# Patient Record
Sex: MALE | Race: WHITE | NOT HISPANIC OR LATINO | Employment: FULL TIME | URBAN - METROPOLITAN AREA
[De-identification: names, ages, dates, MRNs, and addresses within clinical notes are randomized per-mention and may not be internally consistent; named-entity substitution may affect disease eponyms.]

---

## 2017-01-03 PROBLEM — M25.511 RIGHT SHOULDER PAIN: Status: ACTIVE | Noted: 2017-01-03

## 2017-01-03 PROBLEM — S43.439A LABRAL TEAR OF SHOULDER: Status: ACTIVE | Noted: 2017-01-03

## 2019-02-03 ENCOUNTER — HOSPITAL ENCOUNTER (EMERGENCY)
Facility: MEDICAL CENTER | Age: 29
End: 2019-02-03
Attending: EMERGENCY MEDICINE
Payer: MEDICAID

## 2019-02-03 VITALS
WEIGHT: 191.8 LBS | TEMPERATURE: 97.6 F | RESPIRATION RATE: 18 BRPM | DIASTOLIC BLOOD PRESSURE: 84 MMHG | OXYGEN SATURATION: 98 % | HEIGHT: 71 IN | BODY MASS INDEX: 26.85 KG/M2 | HEART RATE: 96 BPM | SYSTOLIC BLOOD PRESSURE: 120 MMHG

## 2019-02-03 DIAGNOSIS — L03.032 PARONYCHIA OF FIFTH TOE OF LEFT FOOT: ICD-10-CM

## 2019-02-03 DIAGNOSIS — S90.222D CONTUSION OF LESSER TOE OF LEFT FOOT WITH DAMAGE TO NAIL, SUBSEQUENT ENCOUNTER: ICD-10-CM

## 2019-02-03 PROCEDURE — A6403 STERILE GAUZE>16 <= 48 SQ IN: HCPCS

## 2019-02-03 PROCEDURE — 303977 HCHG I & D

## 2019-02-03 PROCEDURE — 96372 THER/PROPH/DIAG INJ SC/IM: CPT | Mod: XU

## 2019-02-03 PROCEDURE — 700111 HCHG RX REV CODE 636 W/ 250 OVERRIDE (IP)

## 2019-02-03 PROCEDURE — 99284 EMERGENCY DEPT VISIT MOD MDM: CPT

## 2019-02-03 RX ORDER — KETOROLAC TROMETHAMINE 30 MG/ML
INJECTION, SOLUTION INTRAMUSCULAR; INTRAVENOUS
Status: COMPLETED
Start: 2019-02-03 | End: 2019-02-03

## 2019-02-03 RX ORDER — HYDROCODONE BITARTRATE AND ACETAMINOPHEN 5; 325 MG/1; MG/1
1-2 TABLET ORAL EVERY 6 HOURS PRN
Qty: 12 TAB | Refills: 0 | Status: SHIPPED | OUTPATIENT
Start: 2019-02-03 | End: 2019-02-06

## 2019-02-03 RX ORDER — KETOROLAC TROMETHAMINE 30 MG/ML
30 INJECTION, SOLUTION INTRAMUSCULAR; INTRAVENOUS ONCE
Status: COMPLETED | OUTPATIENT
Start: 2019-02-03 | End: 2019-02-03

## 2019-02-03 RX ORDER — ACETAMINOPHEN 500 MG
1000 TABLET ORAL EVERY 6 HOURS PRN
COMMUNITY

## 2019-02-03 RX ADMIN — KETOROLAC TROMETHAMINE 30 MG: 30 INJECTION, SOLUTION INTRAMUSCULAR; INTRAVENOUS at 13:27

## 2019-02-03 RX ADMIN — KETOROLAC TROMETHAMINE 30 MG: 30 INJECTION, SOLUTION INTRAMUSCULAR at 13:27

## 2019-02-03 ASSESSMENT — PAIN DESCRIPTION - DESCRIPTORS: DESCRIPTORS: ACHING;BURNING

## 2019-02-03 NOTE — ED TRIAGE NOTES
"Pt presents accompanied by his spouse.  As he states, approximately a week afgo he dropped a bucket full of dirt on his left fifth toe.  He C/O possible wound infection.  Chief Complaint   Patient presents with   • Toe Pain     /83   Pulse 72   Temp 36.4 °C (97.6 °F) (Temporal)   Resp 18   Ht 1.803 m (5' 11\")   Wt 87 kg (191 lb 12.8 oz)   SpO2 98%   BMI 26.75 kg/m²     "

## 2019-02-03 NOTE — ED NOTES
Discharged to home with instructions and prescriptions.  Patient instructed to return for wound recheck in 2 days or sooner if needed  Went over signs of worsening infection and dressing changes - all with good understanding by patient

## 2019-02-03 NOTE — ED PROVIDER NOTES
"ED Provider Note    ER PROVIDER NOTE          CHIEF COMPLAINT  Chief Complaint   Patient presents with   • Toe Pain       HPI  Dyllan Davis is a 28 y.o. male who presents to the emergency department complaining of left 5th toe pain.  Patient reports 1 week ago he dropped a bucket on his toe, and it caused the toenail to fall off.  He notes over the past 2 days some increased redness and swelling, went to [facility] last night and was given Keflex states his symptoms have not improved.  He reports he was told to nena tape his toe and this seemed to make it worse.  He denies any fevers or chills, denies any drainage.     No history of diabetes or other immunocompromise    REVIEW OF SYSTEMS  Pertinent positives include toe pain, redness. Pertinent negatives include no fever. See HPI for details. All other systems reviewed and are negative.    PAST MEDICAL HISTORY   has a past medical history of Cold (12/15/16); Heart burn; and Sinus infection.    SOCIAL HISTORY  Social History   Substance Use Topics   • Smoking status: Current Every Day Smoker     Packs/day: 0.25     Years: 5.00     Types: Cigarettes   • Smokeless tobacco: Never Used   • Alcohol use Yes      Comment: Occasionally       SURGICAL HISTORY   has a past surgical history that includes appendectomy laparoscopic (5/1/2015) and shoulder arthroscopy w/ slap / labral repair (Right, 1/3/2017).    CURRENT MEDICATIONS  Home Medications     Reviewed by Yovana Little (Pharmacy Tech) on 02/03/19 at 1246  Med List Status: Complete   Medication Last Dose Status   acetaminophen (TYLENOL) 500 MG Tab 2/2/2019 Active   omeprazole (PRILOSEC) 40 MG delayed-release capsule 2/3/2019 Active                ALLERGIES  No Known Allergies    PHYSICAL EXAM  VITAL SIGNS: /83   Pulse 72   Temp 36.4 °C (97.6 °F) (Temporal)   Resp 18   Ht 1.803 m (5' 11\")   Wt 87 kg (191 lb 12.8 oz)   SpO2 98%   BMI 26.75 kg/m²   Pulse ox interpretation: I interpret this " pulse ox as normal.    Constitutional: Alert.  In no apparent distress.  HENT: Normocephalic, Atraumatic, Bilateral external ears normal. Nose normal.   Eyes: Pupils are equal and reactive. Conjunctiva normal, non-icteric.   Heart: Regular rate and rhythm, no murmurs.    Lungs: Clear to auscultation bilaterally.  Skin: Warm, Dry, No erythema, No rash.   Musculoskeletal: Swelling to fifth digit of left foot and the distal phalanx, moderate erythema, no proximal streaking noted or foot edema or erythema.  Toenail is not present, there is a small fluctuant area on the dorsum was distal aspect of his toe, otherwise no tenderness or major deformities noted. No edema.  Neurologic: Alert, Grossly non-focal.   Psychiatric: Affect normal, Judgment normal, Mood normal, Appears appropriate and not intoxicated.     DIAGNOSTIC STUDIES / PROCEDURES      RADIOLOGY  No orders to display     The radiologist's interpretation of all radiological studies have been reviewed by me.    COURSE & MEDICAL DECISION MAKING  Nursing notes, VS, PMSFHx reviewed in chart.    12:39 PM - Patient seen and examined at bedside. Patient will be treated with ketorolac.     Reviewed records from outside facility, x-ray negative for fracture, 7 day course of Keflex    INCISION AND DRAINAGE PROCEDURE NOTE:  Patient identification was confirmed and consent was obtained.  This procedure was performed by Dr. Bush at 1:18 PM  .  Site: Fifth toe left foot  Sterile procedures observed, prepped with Betadine, sterile drape  Needle size: 276  Anesthetic used (type and amt): 2cc lidocaine for digital block  Blade size: 11  Drainage: 1 cc purulent    Site anesthetized, incision made over site, wound drained and explored loculations, rinsed with copious amounts of normal saline, covered with dry, sterile dressing. Pt tolerated procedure well without complications. Instructions for care discussed verbally and pt provided with additional written instructions for  homecare and f/u.          Decision Making:  This is a 28 y.o. male presented with toe pain after an injury last week.  He does appear to have a soft tissue infection with small paronychia/abscess that was drained as above.  He will continue on his antibiotics and follow-up for recheck in 2 days.  He has no fevers or other findings suggestive of sepsis or progressive infection    I reviewed prescription monitoring program for patient's narcotic use before prescribing a scheduled drug.The patient will not drink alcohol nor drive with prescribed medications. The patient will return for new or worsening symptoms and is stable at the time of discharge.    The patient is referred to a primary physician for blood pressure management, diabetic screening, and for all other preventative health concerns.    In prescribing controlled substances to this patient, I certify that I have obtained and reviewed the medical history of Dyllan Davis. I have also made a good kory effort to obtain applicable records from other providers who have treated the patient and records did not demonstrate any increased risk of substance abuse that would prevent me from prescribing controlled substances.     I have conducted a physical exam and documented it. I have reviewed Mr. Davis’s prescription history as maintained by the [place] Prescription Monitoring Program.     I have assessed the patient’s risk for abuse, dependency, and addiction using the validated Opioid Risk Tool available at https://www.mdcalc.com/fhffzj-oxzh-xfxl-ort-narcotic-abuse.     Given the above, I believe the benefits of controlled substance therapy outweigh the risks. The reasons for prescribing controlled substances include non-narcotic, oral analgesic alternatives have been inadequate for pain control. Accordingly, I have discussed the risk and benefits, treatment plan, and alternative therapies with the patient.         DISPOSITION:  Patient will be discharged home  in stable condition.    FOLLOW UP:  [facility], Emergency Dept  [address]  [place] [address] [phone]  In 2 days  For wound re-check      OUTPATIENT MEDICATIONS:  New Prescriptions    HYDROCODONE-ACETAMINOPHEN (NORCO) 5-325 MG TAB PER TABLET    Take 1-2 Tabs by mouth every 6 hours as needed (pain) for up to 3 days.         FINAL IMPRESSION  1. Paronychia of fifth toe of left foot    2. Contusion of lesser toe of left foot with damage to nail, subsequent encounter        The note accurately reflects work and decisions made by me.  Garcia Bush  2/3/2019  1:36 PM

## 2019-02-03 NOTE — ED NOTES
Med rec updated and complete  Allergies reviewed  Interviewed pt with wife at bedside with permission from pt.  Pt reports no vitamins.  Pt reports no antibiotics in the last 30 days, that he had to take at home.

## 2021-07-11 ENCOUNTER — HOSPITAL ENCOUNTER (EMERGENCY)
Facility: MEDICAL CENTER | Age: 31
End: 2021-07-12
Payer: MEDICAID

## 2021-07-11 VITALS
SYSTOLIC BLOOD PRESSURE: 148 MMHG | WEIGHT: 205.25 LBS | RESPIRATION RATE: 18 BRPM | HEART RATE: 84 BPM | TEMPERATURE: 97.8 F | OXYGEN SATURATION: 96 % | HEIGHT: 71 IN | DIASTOLIC BLOOD PRESSURE: 88 MMHG | BODY MASS INDEX: 28.73 KG/M2

## 2021-07-11 PROCEDURE — 302449 STATCHG TRIAGE ONLY (STATISTIC)

## 2021-07-11 RX ORDER — CITALOPRAM 20 MG/1
10 TABLET ORAL DAILY
COMMUNITY

## 2021-07-12 NOTE — ED TRIAGE NOTES
".  Chief Complaint   Patient presents with   • Panic Attack      Pt ambulate to triage with above complaint. Pt reports being seen by PCP a month ago for same complaint, Pt prescribed Celexa. Pt notes he has been feeling well for the last 3 weeks but notes \"something set me off tonight, either something I ate or?\" Pt took prescribed mediation w/o relief.  Notes he feels bloated and a fullness in his throat, Pt take Prilosec for GERD. Pt relates his abdominal complaints to his panic attack.   Pt educated on triage process and returned to [facility].   "

## 2023-04-14 ENCOUNTER — APPOINTMENT (OUTPATIENT)
Dept: URGENT CARE | Facility: PHYSICIAN GROUP | Age: 33
End: 2023-04-14

## 2023-05-23 ENCOUNTER — APPOINTMENT (OUTPATIENT)
Dept: RADIOLOGY | Facility: MEDICAL CENTER | Age: 33
End: 2023-05-23
Attending: EMERGENCY MEDICINE
Payer: COMMERCIAL

## 2023-05-23 ENCOUNTER — HOSPITAL ENCOUNTER (EMERGENCY)
Facility: MEDICAL CENTER | Age: 33
End: 2023-05-23
Attending: EMERGENCY MEDICINE
Payer: COMMERCIAL

## 2023-05-23 VITALS
SYSTOLIC BLOOD PRESSURE: 99 MMHG | OXYGEN SATURATION: 97 % | TEMPERATURE: 97.3 F | WEIGHT: 160 LBS | HEIGHT: 71 IN | DIASTOLIC BLOOD PRESSURE: 62 MMHG | BODY MASS INDEX: 22.4 KG/M2 | RESPIRATION RATE: 16 BRPM | HEART RATE: 76 BPM

## 2023-05-23 DIAGNOSIS — R55 NEAR SYNCOPE: ICD-10-CM

## 2023-05-23 LAB
ALBUMIN SERPL BCP-MCNC: 4.6 G/DL (ref 3.2–4.9)
ALBUMIN/GLOB SERPL: 1.9 G/DL
ALP SERPL-CCNC: 125 U/L (ref 30–99)
ALT SERPL-CCNC: 23 U/L (ref 2–50)
ANION GAP SERPL CALC-SCNC: 14 MMOL/L (ref 7–16)
AST SERPL-CCNC: 21 U/L (ref 12–45)
BASOPHILS # BLD AUTO: 0.4 % (ref 0–1.8)
BASOPHILS # BLD: 0.04 K/UL (ref 0–0.12)
BILIRUB SERPL-MCNC: 0.4 MG/DL (ref 0.1–1.5)
BUN SERPL-MCNC: 14 MG/DL (ref 8–22)
CALCIUM ALBUM COR SERPL-MCNC: 8.8 MG/DL (ref 8.5–10.5)
CALCIUM SERPL-MCNC: 9.3 MG/DL (ref 8.5–10.5)
CHLORIDE SERPL-SCNC: 104 MMOL/L (ref 96–112)
CO2 SERPL-SCNC: 23 MMOL/L (ref 20–33)
CREAT SERPL-MCNC: 0.97 MG/DL (ref 0.5–1.4)
EKG IMPRESSION: NORMAL
EOSINOPHIL # BLD AUTO: 0.03 K/UL (ref 0–0.51)
EOSINOPHIL NFR BLD: 0.3 % (ref 0–6.9)
ERYTHROCYTE [DISTWIDTH] IN BLOOD BY AUTOMATED COUNT: 40.3 FL (ref 35.9–50)
GFR SERPLBLD CREATININE-BSD FMLA CKD-EPI: 105 ML/MIN/1.73 M 2
GLOBULIN SER CALC-MCNC: 2.4 G/DL (ref 1.9–3.5)
GLUCOSE SERPL-MCNC: 119 MG/DL (ref 65–99)
HCT VFR BLD AUTO: 45.1 % (ref 42–52)
HGB BLD-MCNC: 15.3 G/DL (ref 14–18)
IMM GRANULOCYTES # BLD AUTO: 0.04 K/UL (ref 0–0.11)
IMM GRANULOCYTES NFR BLD AUTO: 0.4 % (ref 0–0.9)
LIPASE SERPL-CCNC: 23 U/L (ref 11–82)
LYMPHOCYTES # BLD AUTO: 2.09 K/UL (ref 1–4.8)
LYMPHOCYTES NFR BLD: 18.6 % (ref 22–41)
MCH RBC QN AUTO: 30.7 PG (ref 27–33)
MCHC RBC AUTO-ENTMCNC: 33.9 G/DL (ref 32.3–36.5)
MCV RBC AUTO: 90.6 FL (ref 81.4–97.8)
MONOCYTES # BLD AUTO: 0.51 K/UL (ref 0–0.85)
MONOCYTES NFR BLD AUTO: 4.5 % (ref 0–13.4)
NEUTROPHILS # BLD AUTO: 8.53 K/UL (ref 1.82–7.42)
NEUTROPHILS NFR BLD: 75.8 % (ref 44–72)
NRBC # BLD AUTO: 0 K/UL
NRBC BLD-RTO: 0 /100 WBC (ref 0–0.2)
PLATELET # BLD AUTO: 218 K/UL (ref 164–446)
PMV BLD AUTO: 9.9 FL (ref 9–12.9)
POTASSIUM SERPL-SCNC: 3.4 MMOL/L (ref 3.6–5.5)
PROT SERPL-MCNC: 7 G/DL (ref 6–8.2)
RBC # BLD AUTO: 4.98 M/UL (ref 4.7–6.1)
SODIUM SERPL-SCNC: 141 MMOL/L (ref 135–145)
TROPONIN T SERPL-MCNC: <6 NG/L (ref 6–19)
WBC # BLD AUTO: 11.2 K/UL (ref 4.8–10.8)

## 2023-05-23 PROCEDURE — 85025 COMPLETE CBC W/AUTO DIFF WBC: CPT

## 2023-05-23 PROCEDURE — 83690 ASSAY OF LIPASE: CPT

## 2023-05-23 PROCEDURE — 93005 ELECTROCARDIOGRAM TRACING: CPT | Performed by: EMERGENCY MEDICINE

## 2023-05-23 PROCEDURE — 93005 ELECTROCARDIOGRAM TRACING: CPT

## 2023-05-23 PROCEDURE — 84484 ASSAY OF TROPONIN QUANT: CPT

## 2023-05-23 PROCEDURE — 71045 X-RAY EXAM CHEST 1 VIEW: CPT

## 2023-05-23 PROCEDURE — 99284 EMERGENCY DEPT VISIT MOD MDM: CPT

## 2023-05-23 PROCEDURE — 36415 COLL VENOUS BLD VENIPUNCTURE: CPT

## 2023-05-23 PROCEDURE — 80053 COMPREHEN METABOLIC PANEL: CPT

## 2023-05-23 NOTE — ED TRIAGE NOTES
"Chief Complaint   Patient presents with    Dizziness     Patient reports dizziness and near syncopal event this morning at 1430. Patient describes dizziness as \"feeling like I am on a boat\" and \"like I am not there.\" Patient reports associated abdominal pain. Patient was concerned with hypoglycemia at time of event. FSBS was 140.     Weakness     X a few months.        "

## 2023-05-24 NOTE — ED NOTES
Patient to YE [facility], changed into gown, placed on monitor, given urinal and asked for urine specimen, call light within reach.

## 2023-05-24 NOTE — ED NOTES
Assist RN: PIV removed. Provided pt with written and verbal instructions regarding follow-up and activity. All questions answered and patient verbalized understanding. Pt ambulated out of unit with steady gait.

## 2023-05-24 NOTE — ED PROVIDER NOTES
"ED Provider Note    CHIEF COMPLAINT  Chief Complaint   Patient presents with    Dizziness     Patient reports dizziness and near syncopal event this morning at 1430. Patient describes dizziness as \"feeling like I am on a boat\" and \"like I am not there.\" Patient reports associated abdominal pain. Patient was concerned with hypoglycemia at time of event. FSBS was 140.     Weakness     X a few months.        EXTERNAL RECORDS REVIEWED  Inpatient Notes ED note 2/3/19    HPI/ROS  LIMITATION TO HISTORY   Select: : None  OUTSIDE HISTORIAN(S):  None    Dyllan Davis is a 32 y.o. male who presents to the emergency department for evaluation of dizziness and weakness.  States that over the last couple of years he has had intermittent episodes where he feels dizzy.  He describes the dizziness as feeling lightheaded.  He states that today it was much worse while he was at work.  He states that his vision narrowed and was black around the edges.  He states he felt like he was going to pass out.  He denies any associated chest pain, shortness of breath, or palpitations.  He denies a headache or focal weakness or numbness.  He states that he generally felt weak.  He did not lose consciousness.  He admits to some nausea but has not vomited.  He has not had any diarrhea or bloody stools.  He does not take any daily medications.    He denies a personal history of hypertension, hyperlipidemia, coronary artery disease or diabetes.  He denies any family history of coronary artery disease.  He denies tobacco use, alcohol or other drug use.    He has otherwise been well with no recent fevers, cough, congestion, or difficulty breathing.    PAST MEDICAL HISTORY   has a past medical history of Cold (12/15/16), Heart burn, and Sinus infection.    SURGICAL HISTORY   has a past surgical history that includes appendectomy laparoscopic (5/1/2015) and shoulder arthroscopy w/ slap / labral repair (Right, 1/3/2017).    FAMILY HISTORY  No family " "history on file.    SOCIAL HISTORY  Social History     Tobacco Use    Smoking status: Former     Packs/day: 0.25     Years: 5.00     Pack years: 1.25     Types: Cigarettes    Smokeless tobacco: Never   Substance and Sexual Activity    Alcohol use: Not Currently     Comment: Occasionally    Drug use: No    Sexual activity: Not on file       CURRENT MEDICATIONS  Home Medications       Reviewed by Beverly Carvajal R.N. (Registered Nurse) on 05/23/23 at 1643  Med List Status: Partial     Medication Last Dose Status   acetaminophen (TYLENOL) 500 MG Tab  Active   citalopram (CELEXA) 20 MG Tab  Active   omeprazole (PRILOSEC) 40 MG delayed-release capsule  Active                  ALLERGIES  No Known Allergies    PHYSICAL EXAM  VITAL SIGNS: /70   Pulse 75   Temp 36.2 °C (97.2 °F) (Temporal)   Resp 17   Ht 1.803 m (5' 11\")   Wt 72.6 kg (160 lb)   SpO2 98%   BMI 22.32 kg/m²   Constitutional: Alert and in no apparent distress.  HENT: Normocephalic atraumatic. Bilateral external ears normal. Bilateral TM's clear. Nose normal. Mucous membranes are moist.  Eyes: Pupils are equal and reactive. Conjunctiva normal. Non-icteric sclera.   Neck: Normal range of motion without tenderness. Supple. No meningeal signs.  Cardiovascular: Regular rate and rhythm. No murmurs, gallops or rubs.  Thorax & Lungs: No retractions, nasal flaring, or tachypnea. Breath sounds are clear to auscultation bilaterally. No wheezing, rhonchi or rales.  Abdomen: Soft, nontender and nondistended. No hepatosplenomegaly.  Skin: Warm and dry. No rashes are noted.  Back: No bony tenderness, No CVA tenderness.   Extremities: 2+ peripheral pulses. Cap refill is less than 2 seconds. No edema, cyanosis, or clubbing.  Musculoskeletal: Good range of motion in all major joints. No tenderness to palpation or major deformities noted.   Neurologic: Alert and oriented x3. The patient moves all 4 extremities without obvious deficits. Patient has symmetry of the face, " specifically with eyebrow raising and smiling. Extraocular muscles are intact. Pupils equal and reactive. Visual fields intact. Tongue is midline with no fasciculations. Soft palate is symmetrical and uvula is midline. Patient is able to resist against force with head rotation bilaterally. Patient is able to shrug shoulders. Hearing is intact bilaterally. Normal finger to nose. No pronator drift. Normal heel to toe. Sensation are grossly intact. Steady gait.     DIAGNOSTIC STUDIES / PROCEDURES  EKG  I have independently interpreted this EKG  Results for orders placed or performed during the hospital encounter of 23   EKG   Result Value Ref Range    Report       [facility] Emergency Dept.    Test Date:  2023  Pt Name:    MELLISSA DICKENS                 Department: ER  MRN:        [id number]                      Room:       [facility]  Gender:     Male                         Technician: [id number]  :        1990                   Requested By:ER TRIAGE PROTOCOL  Order #:    [id number]                    Reading MD: Jelly Florian    Measurements  Intervals                                Axis  Rate:       85                           P:          61  DC:         154                          QRS:        74  QRSD:       95                           T:          47  QT:         360  QTc:        428    Interpretive Statements  Sinus rhythm  No ST elevation or depression noted. No arrhythmias. Intervals are within  normal  limits.  No previous ECG available for comparison  Electronically Signed On 2023 19:31:49 PDT by Jelly Florian       LABS  Results for orders placed or performed during the hospital encounter of 23   CBC WITH DIFFERENTIAL   Result Value Ref Range    WBC 11.2 (H) 4.8 - 10.8 K/uL    RBC 4.98 4.70 - 6.10 M/uL    Hemoglobin 15.3 14.0 - 18.0 g/dL    Hematocrit 45.1 42.0 - 52.0 %    MCV 90.6 81.4 - 97.8 fL    MCH 30.7 27.0 - 33.0 pg    MCHC 33.9 32.3 - 36.5 g/dL    RDW 40.3 35.9 - 50.0  fL    Platelet Count 218 164 - 446 K/uL    MPV 9.9 9.0 - 12.9 fL    Neutrophils-Polys 75.80 (H) 44.00 - 72.00 %    Lymphocytes 18.60 (L) 22.00 - 41.00 %    Monocytes 4.50 0.00 - 13.40 %    Eosinophils 0.30 0.00 - 6.90 %    Basophils 0.40 0.00 - 1.80 %    Immature Granulocytes 0.40 0.00 - 0.90 %    Nucleated RBC 0.00 0.00 - 0.20 /100 WBC    Neutrophils (Absolute) 8.53 (H) 1.82 - 7.42 K/uL    Lymphs (Absolute) 2.09 1.00 - 4.80 K/uL    Monos (Absolute) 0.51 0.00 - 0.85 K/uL    Eos (Absolute) 0.03 0.00 - 0.51 K/uL    Baso (Absolute) 0.04 0.00 - 0.12 K/uL    Immature Granulocytes (abs) 0.04 0.00 - 0.11 K/uL    NRBC (Absolute) 0.00 K/uL   COMP METABOLIC PANEL   Result Value Ref Range    Sodium 141 135 - 145 mmol/L    Potassium 3.4 (L) 3.6 - 5.5 mmol/L    Chloride 104 96 - 112 mmol/L    Co2 23 20 - 33 mmol/L    Anion Gap 14.0 7.0 - 16.0    Glucose 119 (H) 65 - 99 mg/dL    Bun 14 8 - 22 mg/dL    Creatinine 0.97 0.50 - 1.40 mg/dL    Calcium 9.3 8.5 - 10.5 mg/dL    AST(SGOT) 21 12 - 45 U/L    ALT(SGPT) 23 2 - 50 U/L    Alkaline Phosphatase 125 (H) 30 - 99 U/L    Total Bilirubin 0.4 0.1 - 1.5 mg/dL    Albumin 4.6 3.2 - 4.9 g/dL    Total Protein 7.0 6.0 - 8.2 g/dL    Globulin 2.4 1.9 - 3.5 g/dL    A-G Ratio 1.9 g/dL   LIPASE   Result Value Ref Range    Lipase 23 11 - 82 U/L   ESTIMATED GFR   Result Value Ref Range    GFR (CKD-EPI) 105 >60 mL/min/1.73 m 2   CORRECTED CALCIUM   Result Value Ref Range    Correct Calcium 8.8 8.5 - 10.5 mg/dL   TROPONIN   Result Value Ref Range    Troponin T <6 6 - 19 ng/L   EKG   Result Value Ref Range    Report       [facility] Emergency Dept.    Test Date:  2023  Pt Name:    MELLISSA DICKENS                 Department: ER  MRN:        [id number]                      Room:       [facility]  Gender:     Male                         Technician: [id number]  :        1990                   Requested By:ER TRIAGE PROTOCOL  Order #:    [id number]                    Reading MD: Jelly  Chiqui    Measurements  Intervals                                Axis  Rate:       85                           P:          61  DE:         154                          QRS:        74  QRSD:       95                           T:          47  QT:         360  QTc:        428    Interpretive Statements  Sinus rhythm  No ST elevation or depression noted. No arrhythmias. Intervals are within  normal  limits.  No previous ECG available for comparison  Electronically Signed On 5- 19:31:49 PDT by Jelly Florian       RADIOLOGY  I have independently interpreted the diagnostic imaging associated with this visit and am waiting the final reading from the radiologist.   My preliminary interpretation is as follows: Lungs are clear heart appears to be within the normal size limit.  Radiologist interpretation:   DX-CHEST-PORTABLE (1 VIEW)   Final Result      No radiographic evidence of acute cardiopulmonary process.        COURSE & MEDICAL DECISION MAKING    ED Observation Status? Yes; I am placing the patient in to an observation status due to a diagnostic uncertainty as well as therapeutic intensity. Patient placed in observation status at 7:47 PM, 5/23/2023.     Observation plan is as follows: Labs, orthostatics, EKG and chest x-ray    Upon Reevaluation, the patient's condition has: Improved; and will be discharged.    Patient discharged from ED Observation status at 8:57 PM (Time) 5/23/23 (Date).     INITIAL ASSESSMENT, COURSE AND PLAN  Care Narrative: This is a 32-year-old male presenting to the ED for evaluation of dizziness and weakness.  On initial evaluation, the patient appeared well and in no acute distress.  His vital signs are normal and reassuring.  Physical exam was also reassuring with a nonfocal neuro exam.  I have extremity low clinical suspicion for intracranial hemorrhage, mass, or stroke.      His heart and lungs were clear as well and he denied any chest pain or other anginal equivalents.  His EKG did not  show any evidence of acute ischemia, arrhythmia, prolonged QT, widened QRS, WPW, or Brugada.  His troponin was normal.  His heart score is 0 and I have extremely low clinical suspicion for ACS.    Chest x-ray was performed and normal.    His electrolytes were very reassuring.  His potassium was slightly low at 3.4 and his glucose was 119 but no other derangements were noted.  LFTs and lipase were within normal limits and he had no abdominal tenderness.  Less concern for hepatobiliary disease or acute pancreatitis.  His white count was slightly elevated at 11.2 but he denied any history of fevers or other infectious symptoms.  I am less concerned for serious infectious etiology such as sepsis or meningitis.    Orthostatics were performed and negative.    The patient was observed in the ED and upon reassessment.  He is resting comfortably and his vital signs are normal.  He tolerated an oral challenge.  I do think he is stable for discharge at this time.  I encouraged him to follow-up with his primary care physician and to return to the ED with any worsening signs or symptoms.    Syncopal symptoms without worrisome features. Presentation most suggestive of neuro-cardiogenic or orthostatic cause. Very low suspicion for serious arrhythmia, cardiac ischemia or other serious etiology. Patient appears safe for discharge with outpatient observation and close PCP F/U. Syncope warnings discussed with patient.    ADDITIONAL PROBLEM LIST  Near syncope  DISPOSITION AND DISCUSSIONS  I have discussed management of the patient with the following physicians and DOROTA's:  None    Discussion of management with other QHP or appropriate source(s): None     Escalation of care considered, and ultimately not performed:blood analysis, diagnostic imaging, and acute inpatient care management, however at this time, the patient is most appropriate for outpatient management    Barriers to care at this time, including but not limited to:  None .      Decision tools and prescription drugs considered including, but not limited to:  None .    FINAL IMPRESSION  1. Near syncope      PRESCRIPTIONS  New Prescriptions    No medications on file     FOLLOW UP  Du Treviño M.D.  [address] [place] [address] [phone]    Call in 1 day  To schedule a follow up appointment    [facility], Emergency Dept  [address] [facility] [address] [phone]    As needed    -DISCHARGE-    Electronically signed by: Jelly Florian D.O., 5/23/2023 7:34 PM

## 2023-06-03 ENCOUNTER — HOSPITAL ENCOUNTER (EMERGENCY)
Facility: MEDICAL CENTER | Age: 33
End: 2023-06-04
Attending: EMERGENCY MEDICINE
Payer: COMMERCIAL

## 2023-06-03 DIAGNOSIS — R55 NEAR SYNCOPE: ICD-10-CM

## 2023-06-03 DIAGNOSIS — R00.2 PALPITATIONS: ICD-10-CM

## 2023-06-03 LAB — EKG IMPRESSION: NORMAL

## 2023-06-03 PROCEDURE — 93005 ELECTROCARDIOGRAM TRACING: CPT | Performed by: EMERGENCY MEDICINE

## 2023-06-03 PROCEDURE — 84443 ASSAY THYROID STIM HORMONE: CPT

## 2023-06-03 PROCEDURE — 84484 ASSAY OF TROPONIN QUANT: CPT

## 2023-06-03 PROCEDURE — 85025 COMPLETE CBC W/AUTO DIFF WBC: CPT

## 2023-06-03 PROCEDURE — 80053 COMPREHEN METABOLIC PANEL: CPT

## 2023-06-03 PROCEDURE — 94760 N-INVAS EAR/PLS OXIMETRY 1: CPT

## 2023-06-03 PROCEDURE — 99284 EMERGENCY DEPT VISIT MOD MDM: CPT

## 2023-06-03 PROCEDURE — 83735 ASSAY OF MAGNESIUM: CPT

## 2023-06-03 PROCEDURE — 36415 COLL VENOUS BLD VENIPUNCTURE: CPT

## 2023-06-03 PROCEDURE — 93005 ELECTROCARDIOGRAM TRACING: CPT

## 2023-06-03 PROCEDURE — 83690 ASSAY OF LIPASE: CPT

## 2023-06-03 ASSESSMENT — LIFESTYLE VARIABLES: DO YOU DRINK ALCOHOL: NO

## 2023-06-04 ENCOUNTER — APPOINTMENT (OUTPATIENT)
Dept: RADIOLOGY | Facility: MEDICAL CENTER | Age: 33
End: 2023-06-04
Attending: EMERGENCY MEDICINE
Payer: COMMERCIAL

## 2023-06-04 VITALS
OXYGEN SATURATION: 95 % | RESPIRATION RATE: 16 BRPM | DIASTOLIC BLOOD PRESSURE: 68 MMHG | SYSTOLIC BLOOD PRESSURE: 101 MMHG | BODY MASS INDEX: 22.32 KG/M2 | TEMPERATURE: 98.4 F | HEIGHT: 71 IN | HEART RATE: 76 BPM

## 2023-06-04 LAB
ALBUMIN SERPL BCP-MCNC: 4.7 G/DL (ref 3.2–4.9)
ALBUMIN/GLOB SERPL: 2 G/DL
ALP SERPL-CCNC: 126 U/L (ref 30–99)
ALT SERPL-CCNC: 23 U/L (ref 2–50)
ANION GAP SERPL CALC-SCNC: 13 MMOL/L (ref 7–16)
APPEARANCE UR: CLEAR
AST SERPL-CCNC: 18 U/L (ref 12–45)
BASOPHILS # BLD AUTO: 0.6 % (ref 0–1.8)
BASOPHILS # BLD: 0.05 K/UL (ref 0–0.12)
BILIRUB SERPL-MCNC: 0.3 MG/DL (ref 0.1–1.5)
BILIRUB UR QL STRIP.AUTO: NEGATIVE
BUN SERPL-MCNC: 17 MG/DL (ref 8–22)
CALCIUM ALBUM COR SERPL-MCNC: 8.9 MG/DL (ref 8.5–10.5)
CALCIUM SERPL-MCNC: 9.5 MG/DL (ref 8.5–10.5)
CHLORIDE SERPL-SCNC: 105 MMOL/L (ref 96–112)
CO2 SERPL-SCNC: 24 MMOL/L (ref 20–33)
COLOR UR: YELLOW
CREAT SERPL-MCNC: 0.93 MG/DL (ref 0.5–1.4)
EOSINOPHIL # BLD AUTO: 0.01 K/UL (ref 0–0.51)
EOSINOPHIL NFR BLD: 0.1 % (ref 0–6.9)
ERYTHROCYTE [DISTWIDTH] IN BLOOD BY AUTOMATED COUNT: 41.7 FL (ref 35.9–50)
GFR SERPLBLD CREATININE-BSD FMLA CKD-EPI: 111 ML/MIN/1.73 M 2
GLOBULIN SER CALC-MCNC: 2.3 G/DL (ref 1.9–3.5)
GLUCOSE SERPL-MCNC: 97 MG/DL (ref 65–99)
GLUCOSE UR STRIP.AUTO-MCNC: NEGATIVE MG/DL
HCT VFR BLD AUTO: 47.7 % (ref 42–52)
HGB BLD-MCNC: 16 G/DL (ref 14–18)
IMM GRANULOCYTES # BLD AUTO: 0.02 K/UL (ref 0–0.11)
IMM GRANULOCYTES NFR BLD AUTO: 0.2 % (ref 0–0.9)
KETONES UR STRIP.AUTO-MCNC: NEGATIVE MG/DL
LEUKOCYTE ESTERASE UR QL STRIP.AUTO: NEGATIVE
LIPASE SERPL-CCNC: 26 U/L (ref 11–82)
LYMPHOCYTES # BLD AUTO: 2.22 K/UL (ref 1–4.8)
LYMPHOCYTES NFR BLD: 26.2 % (ref 22–41)
MAGNESIUM SERPL-MCNC: 2.1 MG/DL (ref 1.5–2.5)
MCH RBC QN AUTO: 30.9 PG (ref 27–33)
MCHC RBC AUTO-ENTMCNC: 33.5 G/DL (ref 32.3–36.5)
MCV RBC AUTO: 92.1 FL (ref 81.4–97.8)
MICRO URNS: NORMAL
MONOCYTES # BLD AUTO: 0.64 K/UL (ref 0–0.85)
MONOCYTES NFR BLD AUTO: 7.6 % (ref 0–13.4)
NEUTROPHILS # BLD AUTO: 5.53 K/UL (ref 1.82–7.42)
NEUTROPHILS NFR BLD: 65.3 % (ref 44–72)
NITRITE UR QL STRIP.AUTO: NEGATIVE
NRBC # BLD AUTO: 0 K/UL
NRBC BLD-RTO: 0 /100 WBC (ref 0–0.2)
PH UR STRIP.AUTO: 6 [PH] (ref 5–8)
PLATELET # BLD AUTO: 216 K/UL (ref 164–446)
PMV BLD AUTO: 9.6 FL (ref 9–12.9)
POTASSIUM SERPL-SCNC: 3.9 MMOL/L (ref 3.6–5.5)
PROT SERPL-MCNC: 7 G/DL (ref 6–8.2)
PROT UR QL STRIP: NEGATIVE MG/DL
RBC # BLD AUTO: 5.18 M/UL (ref 4.7–6.1)
RBC UR QL AUTO: NEGATIVE
SODIUM SERPL-SCNC: 142 MMOL/L (ref 135–145)
SP GR UR STRIP.AUTO: 1.02
TROPONIN T SERPL-MCNC: <6 NG/L (ref 6–19)
TSH SERPL DL<=0.005 MIU/L-ACNC: 2.21 UIU/ML (ref 0.38–5.33)
UROBILINOGEN UR STRIP.AUTO-MCNC: 0.2 MG/DL
WBC # BLD AUTO: 8.5 K/UL (ref 4.8–10.8)

## 2023-06-04 PROCEDURE — 81003 URINALYSIS AUTO W/O SCOPE: CPT

## 2023-06-04 PROCEDURE — 71045 X-RAY EXAM CHEST 1 VIEW: CPT

## 2023-06-04 NOTE — ED NOTES
ERP at bedside. Urine collected and sent to lab. Pt resting with even chest rise and fall, reports no needs at this time, call light available and in reach.

## 2023-06-04 NOTE — ED PROVIDER NOTES
"                                                        ED Provider Note    CHIEF COMPLAINT  Chief Complaint   Patient presents with    Syncope     The pt reports having a syncope today while sitting down. Loc was reported to be a couple of seconds. The pt denies falling and trauma. The pt reports associated lightheadedness throughout the day. The pt was seen here last week for same thing. Ecg on the watch states that its A-fib. The pt reports mild chest pain and intermittent palpitations. Pain is 1/10, left lower chest, non-radiating. The pt also reports abd pain.     Chest Pain    Abdominal Pain        HPI    Primary care provider: Du Treviño M.D.   History obtained from: Patient and wife  History limited by: None     Dyllan Davis is a 32 y.o. male who presents to the ED with wife complaining of near syncopal episode while in bed.  Patient reports that he felt like passing out and was out for \"a split second or 2\" while in bed when he slumped over.  He states he was seen in this ED last month for the same and has follow-up appointment with cardiology scheduled for June 8.  He reports feeling weak and lightheaded throughout the past week or so.  He states that he feels like his heart is beating fast and irregular and his watch monitor reported that he was in atrial fibrillation.  He reports mild chest discomfort with these episodes.  He has had occasional slight shortness of breath.  He reports occasional nausea and vomiting but no vomiting today.  He reports abdominal pain described as \"gas.\"  No diarrhea or dysuria.  No rash or edema.  No fever or recent illness.  He reports that his grandfather had pacemaker and history of atrial fibrillation and his grandmother also had pacemaker.    REVIEW OF SYSTEMS  Please see HPI for pertinent positives/negatives.  All other systems reviewed and are negative.     PAST MEDICAL HISTORY  Past Medical History:   Diagnosis Date    Cold 12/15/2016    Asthma     Heart " "burn     Sinus infection         SURGICAL HISTORY  Past Surgical History:   Procedure Laterality Date    SHOULDER ARTHROSCOPY W/ SLAP / LABRAL REPAIR Right 1/3/2017    Procedure: SHOULDER ARTHROSCOPY WITH DECOMPRESSION AND LABRAL DEBRIDEMENT;  Surgeon: Alexis Plunkett M.D.;  Location: SURGERY [facility];  Service:     APPENDECTOMY LAPAROSCOPIC  5/1/2015    Performed by John H Ganser, M.D. at SURGERY [facility]        SOCIAL HISTORY  Social History     Tobacco Use    Smoking status: Former     Packs/day: 0.25     Years: 5.00     Pack years: 1.25     Types: Cigarettes    Smokeless tobacco: Never   Vaping Use    Vaping Use: Never used   Substance and Sexual Activity    Alcohol use: Not Currently     Comment: Occasionally    Drug use: No    Sexual activity: Not on file        FAMILY HISTORY  History reviewed. No pertinent family history.     CURRENT MEDICATIONS  Home Medications       Reviewed by Garcia Clemente R.N. (Registered Nurse) on 06/03/23 at [facility]  Med List Status: Partial     Medication Last Dose Status   acetaminophen (TYLENOL) 500 MG Tab  Active   citalopram (CELEXA) 20 MG Tab  Active   omeprazole (PRILOSEC) 40 MG delayed-release capsule  Active                     ALLERGIES  No Known Allergies     PHYSICAL EXAM  VITAL SIGNS: /68   Pulse 76   Temp 36.9 °C (98.4 °F) (Temporal)   Resp 16   Ht 1.803 m (5' 11\")   SpO2 95%   BMI 22.32 kg/m²  @OLGA[[id number]::@     Pulse ox interpretation: 97% I interpret this pulse ox as normal     Cardiac monitor interpretation: Sinus rhythm with heart rate in the 90s as interpreted by me.  The patient presented with palpitations and near syncope and cardiac monitor was ordered to monitor for dysrhythmia.    Constitutional: Well developed, well nourished, alert in no apparent distress, nontoxic appearance    HENT: No external signs of trauma, normocephalic  Eyes: PERRL, conjunctiva without erythema, no discharge, no icterus    Neck: Soft and supple, trachea midline, " no stridor, no tenderness, no LAD, no JVD, good ROM    Cardiovascular: Regular rate and rhythm, no murmurs/rubs/gallops, strong distal pulses and good perfusion    Thorax & Lungs: No respiratory distress, CTAB    Abdomen: Soft, nontender, nondistended, no guarding, no rebound, normal BS    Back: No CVAT     Extremities: No cyanosis, no edema, no gross deformity, good ROM, no tenderness, intact distal pulses with brisk cap refill    Skin: Warm, dry, no pallor/cyanosis, no rash noted      Neuro: A/O times 3, no focal deficits noted    Psychiatric: Cooperative, normal mood and affect, normal judgement, appropriate for clinical situation        DIAGNOSTIC STUDIES / PROCEDURES    EKG  12 Lead EKG obtained at 2320 and interpreted by me:   Rate: 85   Rhythm: Sinus rhythm   Ectopy: None  Intervals: Normal   Axis: Normal   QRS: Normal   ST segments: Normal  T Waves: Normal    Clinical Impression: Sinus rhythm without acute ischemic changes or dysrhythmia  Compared to May 23, 2023 without significant change      LABS  All labs reviewed by me.     Results for orders placed or performed during the hospital encounter of 06/03/23   CBC WITH DIFFERENTIAL   Result Value Ref Range    WBC 8.5 4.8 - 10.8 K/uL    RBC 5.18 4.70 - 6.10 M/uL    Hemoglobin 16.0 14.0 - 18.0 g/dL    Hematocrit 47.7 42.0 - 52.0 %    MCV 92.1 81.4 - 97.8 fL    MCH 30.9 27.0 - 33.0 pg    MCHC 33.5 32.3 - 36.5 g/dL    RDW 41.7 35.9 - 50.0 fL    Platelet Count 216 164 - 446 K/uL    MPV 9.6 9.0 - 12.9 fL    Neutrophils-Polys 65.30 44.00 - 72.00 %    Lymphocytes 26.20 22.00 - 41.00 %    Monocytes 7.60 0.00 - 13.40 %    Eosinophils 0.10 0.00 - 6.90 %    Basophils 0.60 0.00 - 1.80 %    Immature Granulocytes 0.20 0.00 - 0.90 %    Nucleated RBC 0.00 0.00 - 0.20 /100 WBC    Neutrophils (Absolute) 5.53 1.82 - 7.42 K/uL    Lymphs (Absolute) 2.22 1.00 - 4.80 K/uL    Monos (Absolute) 0.64 0.00 - 0.85 K/uL    Eos (Absolute) 0.01 0.00 - 0.51 K/uL    Baso (Absolute) 0.05 0.00  - 0.12 K/uL    Immature Granulocytes (abs) 0.02 0.00 - 0.11 K/uL    NRBC (Absolute) 0.00 K/uL   COMP METABOLIC PANEL   Result Value Ref Range    Sodium 142 135 - 145 mmol/L    Potassium 3.9 3.6 - 5.5 mmol/L    Chloride 105 96 - 112 mmol/L    Co2 24 20 - 33 mmol/L    Anion Gap 13.0 7.0 - 16.0    Glucose 97 65 - 99 mg/dL    Bun 17 8 - 22 mg/dL    Creatinine 0.93 0.50 - 1.40 mg/dL    Calcium 9.5 8.5 - 10.5 mg/dL    AST(SGOT) 18 12 - 45 U/L    ALT(SGPT) 23 2 - 50 U/L    Alkaline Phosphatase 126 (H) 30 - 99 U/L    Total Bilirubin 0.3 0.1 - 1.5 mg/dL    Albumin 4.7 3.2 - 4.9 g/dL    Total Protein 7.0 6.0 - 8.2 g/dL    Globulin 2.3 1.9 - 3.5 g/dL    A-G Ratio 2.0 g/dL   LIPASE   Result Value Ref Range    Lipase 26 11 - 82 U/L   URINALYSIS    Specimen: Urine   Result Value Ref Range    Color Yellow     Character Clear     Specific Gravity 1.018 <1.035    Ph 6.0 5.0 - 8.0    Glucose Negative Negative mg/dL    Ketones Negative Negative mg/dL    Protein Negative Negative mg/dL    Bilirubin Negative Negative    Urobilinogen, Urine 0.2 Negative    Nitrite Negative Negative    Leukocyte Esterase Negative Negative    Occult Blood Negative Negative    Micro Urine Req see below    TSH WITH REFLEX TO FT4   Result Value Ref Range    TSH 2.210 0.380 - 5.330 uIU/mL   MAGNESIUM   Result Value Ref Range    Magnesium 2.1 1.5 - 2.5 mg/dL   TROPONIN   Result Value Ref Range    Troponin T <6 6 - 19 ng/L   CORRECTED CALCIUM   Result Value Ref Range    Correct Calcium 8.9 8.5 - 10.5 mg/dL   ESTIMATED GFR   Result Value Ref Range    GFR (CKD-EPI) 111 >60 mL/min/1.73 m 2   EKG (NOW)   Result Value Ref Range    Report       [facility] Emergency Dept.    Test Date:  2023  Pt Name:    MELLISSA DICKENS                 Department: ER  MRN:        [id number]                      Room:  Gender:     Male                         Technician: [id number]  :        1990                   Requested By:ER TRIAGE PROTOCOL  Order #:     [id number]                    Reading MD:    Measurements  Intervals                                Axis  Rate:       85                           P:          60  NC:         158                          QRS:        75  QRSD:       99                           T:          47  QT:         366  QTc:        436    Interpretive Statements  Sinus rhythm  RSR' in V1 or V2, probably normal variant  Baseline wander in lead(s) I,II,III,aVR,aVF,V1,V3,V4,V5,V6  Compared to ECG 05/23/2023 17:31:59  RSR' in V1 or V2 now present  ST (T wave) deviation no longer present          RADIOLOGY  I have independently interpreted the diagnostic imaging associated with this visit and am waiting the final reading from the radiologist.     DX-CHEST-PORTABLE (1 VIEW)   Final Result      No acute cardiopulmonary abnormality.                COURSE & MEDICAL DECISION MAKING  Nursing notes, VS, PMSFHx reviewed in chart.     Review of past medical records shows the patient was last seen in this ED May 23, 2023 for dizziness and near syncope and weakness.  Patient was seen in the office on May 25, 2023 regarding elevated morning serum cortisol level, hypoglycemia, near syncope.      Differential diagnoses considered include but are not limited to: Atrial fib/flutter, SVT, ventricular tachycardia, WPW, Brugada sydrome, prolonged QT syndrome, PAC, PVC, AMI, CHF, valvular heart disease, thyroid disorder, electrolyte abnormality      ED Observation Status? Yes; I am placing the patient in to an observation status due to a diagnostic uncertainty as well as therapeutic intensity. Patient placed in observation status at 12:02 AM, 6/4/2023.     Observation plan is as follows: We will obtain EKG, imaging and laboratory studies and monitor patient in the ED.    Upon Reevaluation, the patient's condition has: Remained stable and will be discharged.    Patient discharged from ED Observation status at 0115 on June 4, 2023.       INITIAL ASSESSMENT AND PLAN  Care  Narrative: This is a generally healthy 32-year-old male patient who presents to the ED complaining of near syncope episode and palpitations.  Initial exam is benign.  Will obtain EKG, imaging and laboratory studies and closely monitor patient in the ED.      Discussion of management with other [facility] or appropriate source(s): None     Escalation of care considered, and ultimately not performed: acute inpatient care management, however at this time, the patient is most appropriate for outpatient management.     Decision tools and prescription drugs considered including, but not limited to: Medication modification   .        Pt risk-stratified as low risk for MACE in the next 6 weeks by HEART Score (0-3): 1    HISTORY  Highly suspicious +2  Moderately suspicious +1  Slightly suspicious 0    EKG  Significant ST depression +2  Nonspecific repolarization disturbance +1  Normal 0    AGE  ? 65 +2  45-65 +1  < 45 0    RISK FACTORS  Hypercholesterolemia, HTN, DM, Cigarette smoking, positive family history, obesity  ? 3 risk factors or history of atherosclerotic disease +2  1-2 risk factors +1  No risk factors known 0    TROPONIN  ? 3× normal limit +2  1-3× normal limit +1  ? normal limit 0      History and physical exam as above.  EKG without evidence for acute findings and troponin without elevation.  This is unlikely to be ACS.  Laboratory testing unremarkable and stable compared to May 23, 2023.  Chest x-ray without evidence for acute abnormality.  Patient was closely monitored in the ED and remained clinically stable.  No worrisome dysrhythmia or hemodynamic instability noted.  Patient showed me the EKG tracing from his watch monitor and it shows baseline artifacts which leads it to be read as A-fib but is actually in sinus rhythm.  I discussed the findings with patient and wife.  This is an alert and pleasant patient in no acute distress and nontoxic in appearance with a benign exam.  No focal neurological findings to  suggest CVA.  At this time, no convincing evidence for emergent pathology or indications for admission.  He was advised to keep his upcoming appointment with cardiology for further evaluation.  Return to ED precautions given.  Patient and wife verbalized understanding and agreed with plan of care with no further questions or concerns.      The patient is referred to a primary physician for blood pressure management, diabetic screening, and for all other preventative health concerns.       FINAL IMPRESSION  1. Near syncope Acute   2. Palpitations           DISPOSITION  Patient will be discharged home in stable condition.       FOLLOW UP  Du Treviño M.D.  [address] [facility]  [place] [id number] [phone]    Call in 1 day      Please keep your cardiology appointment this week          [facility], Emergency Dept  [address] [facility] [address] [phone]    If symptoms worsen         OUTPATIENT MEDICATIONS  Discharge Medication List as of 6/4/2023  1:44 AM             Electronically signed by: Lenard Mac D.O., 6/4/2023 12:02 AM      Portions of this record were made with voice recognition software.  Despite my review, spelling/grammar/context errors may still remain.  Interpretation of this chart should be taken in this context.

## 2023-06-04 NOTE — ED TRIAGE NOTES
"Chief Complaint   Patient presents with    Syncope     The pt reports having a syncope today while sitting down. Loc was reported to be a couple of seconds. The pt denies falling and trauma. The pt reports associated lightheadedness throughout the day. The pt was seen here last week for same thing. Ecg on the watch states that its A-fib. The pt reports mild chest pain and intermittent palpitations. Pain is 1/10, left lower chest, non-radiating. The pt also reports abd pain.     Chest Pain    Abdominal Pain       Pt ambulatory to triage. Pt A&Ox4, for the above complaint.     Pt to lobby . Pt educated on alerting staff in changes to condition. Pt verbalized understanding. Protocol abd pain ordered.     /78   Pulse 89   Temp 36.9 °C (98.4 °F) (Temporal)   Resp 16   Ht 1.803 m (5' 11\")   SpO2 99%   BMI 22.32 kg/m²     "

## 2023-06-04 NOTE — ED NOTES
Pt resting comfortably with even chest rise and fall, reports no needs at this time, call light available and in reach.

## 2023-06-04 NOTE — ED NOTES
Pt ambulated from lobby to room 28 without assistance, tolerated well. Pt placed in hospital gown and is now sitting up in bed with even and unlabored breaths, in no apparent distress at this time. PIV placed, chart up for ERP.

## 2023-09-08 ENCOUNTER — ANCILLARY PROCEDURE (OUTPATIENT)
Dept: CARDIOLOGY | Facility: MEDICAL CENTER | Age: 33
End: 2023-09-08
Attending: INTERNAL MEDICINE
Payer: COMMERCIAL

## 2023-09-08 DIAGNOSIS — R00.2 PALPITATIONS: ICD-10-CM

## 2023-09-08 PROCEDURE — 93306 TTE W/DOPPLER COMPLETE: CPT

## 2023-09-09 LAB
LV EJECT FRACT  99904: 65
LV EJECT FRACT MOD 2C 99903: 65.6
LV EJECT FRACT MOD 4C 99902: 63.78
LV EJECT FRACT MOD BP 99901: 65.36

## 2023-09-09 PROCEDURE — 93306 TTE W/DOPPLER COMPLETE: CPT | Mod: 26 | Performed by: INTERNAL MEDICINE

## 2024-01-06 ENCOUNTER — OFFICE VISIT (OUTPATIENT)
Dept: URGENT CARE | Facility: PHYSICIAN GROUP | Age: 34
End: 2024-01-06
Payer: COMMERCIAL

## 2024-01-06 VITALS
HEART RATE: 84 BPM | BODY MASS INDEX: 23.36 KG/M2 | WEIGHT: 166.89 LBS | TEMPERATURE: 98.3 F | RESPIRATION RATE: 16 BRPM | OXYGEN SATURATION: 98 % | DIASTOLIC BLOOD PRESSURE: 68 MMHG | SYSTOLIC BLOOD PRESSURE: 124 MMHG | HEIGHT: 71 IN

## 2024-01-06 DIAGNOSIS — J01.90 ACUTE NON-RECURRENT SINUSITIS, UNSPECIFIED LOCATION: ICD-10-CM

## 2024-01-06 PROCEDURE — 3074F SYST BP LT 130 MM HG: CPT | Performed by: NURSE PRACTITIONER

## 2024-01-06 PROCEDURE — 3078F DIAST BP <80 MM HG: CPT | Performed by: NURSE PRACTITIONER

## 2024-01-06 PROCEDURE — 99203 OFFICE O/P NEW LOW 30 MIN: CPT | Performed by: NURSE PRACTITIONER

## 2024-01-06 RX ORDER — AMOXICILLIN AND CLAVULANATE POTASSIUM 875; 125 MG/1; MG/1
1 TABLET, FILM COATED ORAL 2 TIMES DAILY
Qty: 14 TABLET | Refills: 0 | Status: SHIPPED | OUTPATIENT
Start: 2024-01-06 | End: 2024-01-13

## 2024-01-06 ASSESSMENT — VISUAL ACUITY: OU: 1

## 2024-01-06 ASSESSMENT — ENCOUNTER SYMPTOMS
SINUS PRESSURE: 1
CONSTITUTIONAL NEGATIVE: 1
SINUS PAIN: 1
FEVER: 0

## 2024-01-06 ASSESSMENT — FIBROSIS 4 INDEX: FIB4 SCORE: 0.59

## 2024-01-07 NOTE — PROGRESS NOTES
Subjective:     Dyllan Davis is a 33 y.o. male who presents for Sinusitis (Started this morning with a possible sinus infection, sinus pressure, slight right ear ache )       Sinusitis  This is a new problem. The problem has been gradually worsening since onset. Associated symptoms include congestion and sinus pressure.     3 weeks ago, patient had cold symptoms.  Had initial improvement after about 2 weeks.  However, worsening again after cleaning the ship.  Reports history of sinus infection with similar symptoms.  Has had sinus surgery in the past.  Using saline rinses.    Review of Systems   Constitutional: Negative.  Negative for fever.   HENT:  Positive for congestion, sinus pressure and sinus pain.    All other systems reviewed and are negative.    Refer to HPI for additional details.    During this visit, appropriate PPE was worn, and hand hygiene was performed.    PMH:  has a past medical history of Asthma, Cold (12/15/2016), Heart burn, and Sinus infection.    MEDS:   Current Outpatient Medications:     amoxicillin-clavulanate (AUGMENTIN) 875-125 MG Tab, Take 1 Tablet by mouth 2 times a day for 7 days., Disp: 14 Tablet, Rfl: 0    citalopram (CELEXA) 20 MG Tab, Take 10 mg by mouth every day., Disp: , Rfl:     acetaminophen (TYLENOL) 500 MG Tab, Take 1,000 mg by mouth every 6 hours as needed for Moderate Pain., Disp: , Rfl:     omeprazole (PRILOSEC) 40 MG delayed-release capsule, Take 40 mg by mouth every day., Disp: , Rfl:     ALLERGIES: No Known Allergies  SURGHX:   Past Surgical History:   Procedure Laterality Date    SHOULDER ARTHROSCOPY W/ SLAP / LABRAL REPAIR Right 1/3/2017    Procedure: SHOULDER ARTHROSCOPY WITH DECOMPRESSION AND LABRAL DEBRIDEMENT;  Surgeon: Alexis Plunkett M.D.;  Location: [facility];  Service:     APPENDECTOMY LAPAROSCOPIC  5/1/2015    Performed by John H Ganser, M.D. at SURGERY [facility]     SOCHX:  reports that he has quit smoking. His smoking use included  "cigarettes. He has a 1.3 pack-year smoking history. He has never used smokeless tobacco. He reports that he does not currently use alcohol. He reports that he does not use drugs.    FH: Per HPI as applicable/pertinent.      Objective:     /68 (BP Location: Left arm, Patient Position: Sitting, BP Cuff Size: Adult)   Pulse 84   Temp 36.8 °C (98.3 °F) (Temporal)   Resp 16   Ht 1.803 m (5' 11\")   Wt 75.7 kg (166 lb 14.2 oz)   SpO2 98%   BMI 23.28 kg/m²     Physical Exam  Nursing note reviewed.   Constitutional:       General: He is not in acute distress.     Appearance: He is well-developed. He is not ill-appearing or toxic-appearing.   HENT:      Right Ear: Tympanic membrane normal.      Left Ear: Tympanic membrane normal.      Nose:      Right Sinus: Maxillary sinus tenderness and frontal sinus tenderness present.      Mouth/Throat:      Mouth: Mucous membranes are moist.      Pharynx: Oropharynx is clear.   Eyes:      General: Vision grossly intact.   Cardiovascular:      Rate and Rhythm: Normal rate.   Pulmonary:      Effort: Pulmonary effort is normal. No respiratory distress.   Musculoskeletal:         General: No deformity. Normal range of motion.   Skin:     General: Skin is warm and dry.      Coloration: Skin is not pale.   Neurological:      Mental Status: He is alert and oriented to person, place, and time.      Motor: No weakness.   Psychiatric:         Behavior: Behavior normal. Behavior is cooperative.       Assessment/Plan:     1. Acute non-recurrent sinusitis, unspecified location  - amoxicillin-clavulanate (AUGMENTIN) 875-125 MG Tab; Take 1 Tablet by mouth 2 times a day for 7 days.  Dispense: 14 Tablet; Refill: 0    Rx as above sent electronically.    Suggest using any combination of the following over-the-counter medications per 's instructions:  - sinus rinse kits, neti pot, nasal saline sprays  - nasal steroid sprays (e.g. fluticasone/Flonase, Nasacort)  - oral daily " antihistamine (e.g. loratadine/Claritin, Zyrtec, Allegra)  - oral decongestant (e.g. pseudoephedrine, Sudafed)  - oral pain reliever/fever reducer (e.g. acetaminophen, Tylenol)  - oral anti-inflammatory/pain reliever/fever reducer (NSAID) (e.g. ibuprofen, Motrin, Advil)    Differential diagnosis, natural history, supportive care, over-the-counter symptom management per 's instructions, close monitoring, and indications for immediate follow-up discussed.     All questions answered. Patient agrees with the plan of care.    [facility]

## 2024-04-10 ENCOUNTER — OFFICE VISIT (OUTPATIENT)
Dept: URGENT CARE | Facility: PHYSICIAN GROUP | Age: 34
End: 2024-04-10
Payer: COMMERCIAL

## 2024-04-10 VITALS
SYSTOLIC BLOOD PRESSURE: 110 MMHG | OXYGEN SATURATION: 97 % | RESPIRATION RATE: 18 BRPM | BODY MASS INDEX: 21.7 KG/M2 | WEIGHT: 155 LBS | HEIGHT: 71 IN | DIASTOLIC BLOOD PRESSURE: 70 MMHG | HEART RATE: 86 BPM | TEMPERATURE: 98.8 F

## 2024-04-10 DIAGNOSIS — J01.41 ACUTE RECURRENT PANSINUSITIS: ICD-10-CM

## 2024-04-10 PROCEDURE — 99213 OFFICE O/P EST LOW 20 MIN: CPT

## 2024-04-10 PROCEDURE — 3074F SYST BP LT 130 MM HG: CPT

## 2024-04-10 PROCEDURE — 3078F DIAST BP <80 MM HG: CPT

## 2024-04-10 RX ORDER — AMOXICILLIN AND CLAVULANATE POTASSIUM 875; 125 MG/1; MG/1
1 TABLET, FILM COATED ORAL 2 TIMES DAILY
Qty: 14 TABLET | Refills: 0 | Status: SHIPPED | OUTPATIENT
Start: 2024-04-10 | End: 2024-04-17

## 2024-04-10 ASSESSMENT — FIBROSIS 4 INDEX: FIB4 SCORE: 0.59

## 2024-04-10 NOTE — PROGRESS NOTES
Subjective:   Dyllan Davis is a 33 y.o. male who presents for Sinusitis (Been having a lot of sinus pressure on his face x 10 days and is prone to getting sinus infections a lot )      HPI:    Patient presents to urgent care with concerns of 10 days of rhinorrhea, nasal congestion, headache, sore throat, dry cough.    Mild improvement with Advil, warm showers, nasal rinses, antihistamines.   Reports worsening of symptoms in the past 1-2 days.   Endorses very light dizziness, upper teeth throbbing, chills  Denies wheezing, chest tightness, SOB  No fever.  Tolerating diet.  Denies known sick contacts.      ROS As above in HPI    Medications:    Current Outpatient Medications on File Prior to Visit   Medication Sig Dispense Refill    citalopram (CELEXA) 20 MG Tab Take 10 mg by mouth every day. (Patient not taking: Reported on 4/10/2024)      acetaminophen (TYLENOL) 500 MG Tab Take 1,000 mg by mouth every 6 hours as needed for Moderate Pain. (Patient not taking: Reported on 4/10/2024)      omeprazole (PRILOSEC) 40 MG delayed-release capsule Take 40 mg by mouth every day. (Patient not taking: Reported on 4/10/2024)       No current facility-administered medications on file prior to visit.        Allergies:   Patient has no known allergies.    Problem List:   Patient Active Problem List   Diagnosis    Appendicitis    Right shoulder pain    Labral tear of shoulder        Surgical History:  Past Surgical History:   Procedure Laterality Date    SHOULDER ARTHROSCOPY W/ SLAP / LABRAL REPAIR Right 1/3/2017    Procedure: SHOULDER ARTHROSCOPY WITH DECOMPRESSION AND LABRAL DEBRIDEMENT;  Surgeon: Alexis Plunkett M.D.;  Location: SURGERY [facility];  Service:     APPENDECTOMY LAPAROSCOPIC  5/1/2015    Performed by John H Ganser, M.D. at [facility]       Past Social Hx:   Social History     Tobacco Use    Smoking status: Former     Current packs/day: 0.25     Average packs/day: 0.3 packs/day for 5.0 years (1.3  "ttl pk-yrs)     Types: Cigarettes    Smokeless tobacco: Never   Vaping Use    Vaping Use: Never used   Substance Use Topics    Alcohol use: Not Currently     Comment: Occasionally    Drug use: No          Problem list, medications, and allergies reviewed by myself today in Epic.     Objective:     /70 (BP Location: Right arm, Patient Position: Sitting, BP Cuff Size: Adult)   Pulse 86   Temp 37.1 °C (98.8 °F) (Temporal)   Resp 18   Ht 1.803 m (5' 11\")   Wt 70.3 kg (155 lb)   SpO2 97%   BMI 21.62 kg/m²     Physical Exam  Vitals and nursing note reviewed.   Constitutional:       General: He is not in acute distress.     Appearance: Normal appearance. He is not ill-appearing or diaphoretic.   HENT:      Head: Normocephalic.      Right Ear: Tympanic membrane and ear canal normal.      Left Ear: Tympanic membrane and ear canal normal.      Nose: Mucosal edema, congestion and rhinorrhea present. Rhinorrhea is purulent.      Right Sinus: Maxillary sinus tenderness and frontal sinus tenderness present.      Left Sinus: Maxillary sinus tenderness and frontal sinus tenderness present.      Mouth/Throat:      Mouth: Mucous membranes are moist.      Pharynx: Oropharynx is clear. Uvula midline. Posterior oropharyngeal erythema (MOD PND) present. No pharyngeal swelling or oropharyngeal exudate.   Cardiovascular:      Rate and Rhythm: Normal rate and regular rhythm.      Heart sounds: Normal heart sounds. No murmur heard.     No friction rub. No gallop.   Pulmonary:      Effort: Pulmonary effort is normal. No respiratory distress.      Breath sounds: Normal breath sounds. No stridor. No wheezing, rhonchi or rales.   Chest:      Chest wall: No tenderness.   Abdominal:      General: Abdomen is flat. Bowel sounds are normal.      Palpations: Abdomen is soft.   Musculoskeletal:      Cervical back: No rigidity or tenderness.   Lymphadenopathy:      Cervical: No cervical adenopathy.   Skin:     General: Skin is warm and dry. "      Capillary Refill: Capillary refill takes less than 2 seconds.      Findings: No rash.   Neurological:      Mental Status: He is alert and oriented to person, place, and time.         Assessment/Plan:       Diagnosis and associated orders:   1. Acute recurrent pansinusitis  - amoxicillin-clavulanate (AUGMENTIN) 875-125 MG Tab; Take 1 Tablet by mouth 2 times a day for 7 days.  Dispense: 14 Tablet; Refill: 0        Comments/MDM:     Increase clear fluid intake, rest.  Continue Antihistamines, Flonase  Salt water gargles, ice chips to soothe throat, lozenges  Increase emitted use humidifier by bedside.    Exposure to steam for expectoration.  Nasal saline as needed.  Return to UC if not improved over the next 2-3 days   Follow-up with primary care advised.         Return to clinic or go to ED if symptoms worsen or persist. Indications for ED discussed at length. Patient/Parent/Guardian voices understanding. Follow-up with your primary care provider in 3-5 days. Red flag symptoms discussed. All side effects of medication discussed including allergic response, GI upset, tendon injury, rash, sedation etc.    Please note that this dictation was created using voice recognition software. I have made a reasonable attempt to correct obvious errors, but I expect that there are errors of grammar and possibly content that I did not discover before finalizing the note.    This note was electronically signed by NANCY Morrell

## 2024-11-17 ENCOUNTER — OFFICE VISIT (OUTPATIENT)
Dept: URGENT CARE | Facility: PHYSICIAN GROUP | Age: 34
End: 2024-11-17
Payer: COMMERCIAL

## 2024-11-17 ENCOUNTER — HOSPITAL ENCOUNTER (OUTPATIENT)
Dept: RADIOLOGY | Facility: MEDICAL CENTER | Age: 34
End: 2024-11-17
Attending: STUDENT IN AN ORGANIZED HEALTH CARE EDUCATION/TRAINING PROGRAM
Payer: COMMERCIAL

## 2024-11-17 VITALS
TEMPERATURE: 97.7 F | DIASTOLIC BLOOD PRESSURE: 70 MMHG | WEIGHT: 177.2 LBS | SYSTOLIC BLOOD PRESSURE: 118 MMHG | RESPIRATION RATE: 20 BRPM | BODY MASS INDEX: 24.81 KG/M2 | HEART RATE: 84 BPM | HEIGHT: 71 IN | OXYGEN SATURATION: 97 %

## 2024-11-17 DIAGNOSIS — S69.91XA INJURY OF FINGER OF RIGHT HAND, INITIAL ENCOUNTER: ICD-10-CM

## 2024-11-17 PROCEDURE — 99213 OFFICE O/P EST LOW 20 MIN: CPT | Performed by: STUDENT IN AN ORGANIZED HEALTH CARE EDUCATION/TRAINING PROGRAM

## 2024-11-17 PROCEDURE — 3078F DIAST BP <80 MM HG: CPT | Performed by: STUDENT IN AN ORGANIZED HEALTH CARE EDUCATION/TRAINING PROGRAM

## 2024-11-17 PROCEDURE — 3074F SYST BP LT 130 MM HG: CPT | Performed by: STUDENT IN AN ORGANIZED HEALTH CARE EDUCATION/TRAINING PROGRAM

## 2024-11-17 PROCEDURE — 73140 X-RAY EXAM OF FINGER(S): CPT | Mod: RT

## 2024-11-17 ASSESSMENT — FIBROSIS 4 INDEX: FIB4 SCORE: 0.59
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
## 2024-12-02 ASSESSMENT — ENCOUNTER SYMPTOMS
CHILLS: 0
FEVER: 0
TINGLING: 0
WEAKNESS: 0